# Patient Record
Sex: MALE | Race: WHITE | NOT HISPANIC OR LATINO
[De-identification: names, ages, dates, MRNs, and addresses within clinical notes are randomized per-mention and may not be internally consistent; named-entity substitution may affect disease eponyms.]

---

## 2017-08-31 ENCOUNTER — TRANSCRIPTION ENCOUNTER (OUTPATIENT)
Age: 42
End: 2017-08-31

## 2018-02-03 ENCOUNTER — TRANSCRIPTION ENCOUNTER (OUTPATIENT)
Age: 43
End: 2018-02-03

## 2019-02-28 PROBLEM — Z00.00 ENCOUNTER FOR PREVENTIVE HEALTH EXAMINATION: Status: ACTIVE | Noted: 2019-02-28

## 2019-03-06 ENCOUNTER — APPOINTMENT (OUTPATIENT)
Dept: ORTHOPEDIC SURGERY | Facility: CLINIC | Age: 44
End: 2019-03-06
Payer: COMMERCIAL

## 2019-03-06 VITALS — HEIGHT: 72 IN | WEIGHT: 187 LBS | BODY MASS INDEX: 25.33 KG/M2

## 2019-03-06 DIAGNOSIS — S83.242A OTHER TEAR OF MEDIAL MENISCUS, CURRENT INJURY, LEFT KNEE, INITIAL ENCOUNTER: ICD-10-CM

## 2019-03-06 DIAGNOSIS — Z78.9 OTHER SPECIFIED HEALTH STATUS: ICD-10-CM

## 2019-03-06 DIAGNOSIS — Z82.61 FAMILY HISTORY OF ARTHRITIS: ICD-10-CM

## 2019-03-06 PROCEDURE — 99204 OFFICE O/P NEW MOD 45 MIN: CPT

## 2019-03-06 PROCEDURE — 73562 X-RAY EXAM OF KNEE 3: CPT | Mod: LT

## 2019-03-07 PROBLEM — Z82.61 FAMILY HISTORY OF ARTHRITIS: Status: ACTIVE | Noted: 2019-03-06

## 2019-03-07 PROBLEM — Z78.9 CURRENT NON-DRINKER OF ALCOHOL: Status: ACTIVE | Noted: 2019-03-06

## 2019-03-07 PROBLEM — Z78.9 CURRENT NON-SMOKER: Status: ACTIVE | Noted: 2019-03-06

## 2019-03-15 ENCOUNTER — OUTPATIENT (OUTPATIENT)
Dept: OUTPATIENT SERVICES | Facility: HOSPITAL | Age: 44
LOS: 1 days | End: 2019-03-15
Payer: COMMERCIAL

## 2019-03-15 ENCOUNTER — APPOINTMENT (OUTPATIENT)
Dept: MRI IMAGING | Facility: CLINIC | Age: 44
End: 2019-03-15
Payer: COMMERCIAL

## 2019-03-15 DIAGNOSIS — Z00.8 ENCOUNTER FOR OTHER GENERAL EXAMINATION: ICD-10-CM

## 2019-03-15 PROCEDURE — 73721 MRI JNT OF LWR EXTRE W/O DYE: CPT | Mod: 26,LT

## 2019-03-15 PROCEDURE — 73721 MRI JNT OF LWR EXTRE W/O DYE: CPT

## 2019-03-28 ENCOUNTER — APPOINTMENT (OUTPATIENT)
Dept: ORTHOPEDIC SURGERY | Facility: CLINIC | Age: 44
End: 2019-03-28
Payer: COMMERCIAL

## 2019-03-28 DIAGNOSIS — M22.42 CHONDROMALACIA PATELLAE, LEFT KNEE: ICD-10-CM

## 2019-03-28 PROCEDURE — 99214 OFFICE O/P EST MOD 30 MIN: CPT

## 2019-03-29 PROBLEM — M22.42 CHONDROMALACIA OF LEFT PATELLA: Status: ACTIVE | Noted: 2019-03-29

## 2019-07-08 ENCOUNTER — TRANSCRIPTION ENCOUNTER (OUTPATIENT)
Age: 44
End: 2019-07-08

## 2021-03-19 PROBLEM — Z00.00 ENCOUNTER FOR PREVENTIVE HEALTH EXAMINATION: Noted: 2021-03-19

## 2021-03-22 ENCOUNTER — APPOINTMENT (OUTPATIENT)
Dept: SURGICAL ONCOLOGY | Facility: CLINIC | Age: 46
End: 2021-03-22

## 2021-03-22 DIAGNOSIS — D09.9 CARCINOMA IN SITU, UNSPECIFIED: ICD-10-CM

## 2021-03-22 DIAGNOSIS — Z78.9 OTHER SPECIFIED HEALTH STATUS: ICD-10-CM

## 2021-03-22 NOTE — REASON FOR VISIT
[Initial Consultation] : an initial consultation for [Skin Cancer] : skin caner [FreeTextEntry2] : squamous cell carcinoma left dorsal hand

## 2021-03-22 NOTE — HISTORY OF PRESENT ILLNESS
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 He was referred by Linsey Jack MD.\par \par He recently underwent a shave biopsy of the left radial dorsal hand on 3/11/21, dermatopathology revealed a diagnosis of squamous cell carcinoma in situ. \par \par He has a past medical history of??? . He is non-smoker, he consumes 1 alcoholic beverage daily.

## 2021-03-31 ENCOUNTER — APPOINTMENT (OUTPATIENT)
Dept: DERMATOLOGY | Facility: CLINIC | Age: 46
End: 2021-03-31
Payer: COMMERCIAL

## 2021-03-31 DIAGNOSIS — D04.60 CARCINOMA IN SITU OF SKIN OF UNSPECIFIED UPPER LIMB, INCLUDING SHOULDER: ICD-10-CM

## 2021-03-31 PROCEDURE — 99204 OFFICE O/P NEW MOD 45 MIN: CPT

## 2021-03-31 PROCEDURE — 99072 ADDL SUPL MATRL&STAF TM PHE: CPT

## 2021-03-31 RX ORDER — MUPIROCIN 20 MG/G
2 OINTMENT TOPICAL TWICE DAILY
Qty: 1 | Refills: 0 | Status: ACTIVE | COMMUNITY
Start: 2021-03-31 | End: 1900-01-01

## 2021-05-03 ENCOUNTER — APPOINTMENT (OUTPATIENT)
Dept: DERMATOLOGY | Facility: CLINIC | Age: 46
End: 2021-05-03

## 2023-08-22 ENCOUNTER — NON-APPOINTMENT (OUTPATIENT)
Age: 48
End: 2023-08-22

## 2025-02-24 ENCOUNTER — NON-APPOINTMENT (OUTPATIENT)
Age: 50
End: 2025-02-24

## 2025-07-03 ENCOUNTER — TELEMEDICINE (OUTPATIENT)
Dept: OTHER | Facility: HOSPITAL | Age: 50
End: 2025-07-03
Payer: COMMERCIAL

## 2025-07-03 VITALS — TEMPERATURE: 102.5 F

## 2025-07-03 DIAGNOSIS — S81.802A TRAUMATIC OPEN WOUND OF LEFT LOWER LEG WITH INFECTION, INITIAL ENCOUNTER: Primary | ICD-10-CM

## 2025-07-03 DIAGNOSIS — L08.9 TRAUMATIC OPEN WOUND OF LEFT LOWER LEG WITH INFECTION, INITIAL ENCOUNTER: Primary | ICD-10-CM

## 2025-07-03 PROBLEM — M25.50 ARTHRALGIA: Status: ACTIVE | Noted: 2024-05-24

## 2025-07-03 PROBLEM — K21.9 GASTROESOPHAGEAL REFLUX DISEASE: Status: ACTIVE | Noted: 2024-06-06

## 2025-07-03 PROCEDURE — 99214 OFFICE O/P EST MOD 30 MIN: CPT | Performed by: PHYSICIAN ASSISTANT

## 2025-07-03 RX ORDER — SULFAMETHOXAZOLE AND TRIMETHOPRIM 800; 160 MG/1; MG/1
1 TABLET ORAL 2 TIMES DAILY
Qty: 14 TABLET | Refills: 0 | Status: SHIPPED | OUTPATIENT
Start: 2025-07-03 | End: 2025-07-10

## 2025-07-03 RX ORDER — ALPRAZOLAM 0.5 MG
0.5 TABLET ORAL DAILY PRN
COMMUNITY

## 2025-07-03 RX ORDER — CELECOXIB 200 MG/1
200 CAPSULE ORAL DAILY PRN
COMMUNITY
Start: 2025-02-23

## 2025-07-04 NOTE — PATIENT INSTRUCTIONS
"\"Patient hoping to avoid ED/admission. Discussed he has been on abx <48 hours, so not definite outpt failure at this point. Given necrosis of wound center, concern for MRSA. Doubt brown recluse bite based on appearance. If no improvement in fever in 24-48 hours, streaking redness develops, or redness spreads beyond the 48 hours, recommend ED evaluation\"    Warm water soaks +/- Epsom salt 3 times daily. After 2 full days of being on the antibiotic, outline the area of redness with a permanent marker. If the redness spreads beyond the marked area, you develop fevers/chills, worsening pain, or streaking redness, you should go to the emergency department for evaluation. Please schedule a follow-up with your primary care provider as soon as possible.    [facility] Phone number is [phone] if you need assistance or have further questions  Notes for work/school can be found in the \"Letters\" section of [facility] radha    You can go to any outpatient [facility] to complete the blood cultures that were ordered  Just provide your name and date of birth at registration and they will be able to pull up the orders.  You can search for a lab using [facility] \"Find Care\" and filter by \"Labs\" or click the link below:  [email or web address]    The results will go directly to your [facility] under \"Test Results\"  [email or web address]  "

## 2025-07-04 NOTE — PROGRESS NOTES
Virtual Regular Visit  Name: Venkata Hawkins      : 1975      MRN: [id number]  Encounter Provider: Shannon D Severino, PA-C  Encounter Date: 7/3/2025   Encounter department: VIRTUAL CARE       Verification of patient location:  Patient is located at Other in the following state in which I hold an active license [place] :  Assessment & Plan  Traumatic open wound of left lower leg with infection, initial encounter    Orders:    sulfamethoxazole-trimethoprim (BACTRIM DS) 800-160 mg per tablet; Take 1 tablet by mouth in the morning and 1 tablet before bedtime. Do all this for 7 days.    Blood culture; Future    Blood culture; Future    Patient hoping to avoid ED/admission. Discussed he has been on abx <48 hours, so not definite outpt failure at this point. Given necrosis of wound center, concern for MRSA. Doubt brown recluse bite based on appearance. If no improvement in fever in 24-48 hours, streaking redness develops, or redness spreads beyond the 48 hours, recommend ED evaluation. Case complicated by holiday tomorrow.    Encounter provider Shannon D Severino, PA-C    The patient was identified by name and date of birth. Venkata Hawkins was informed that this is a telemedicine visit and that the visit is being conducted through the Epic Embedded platform. He agrees to proceed..  My office door was closed. No one else was in the room. He acknowledged consent and understanding of privacy and security of the video platform. The patient has agreed to participate and understands they can discontinue the visit at any time.    Patient is aware this is a billable service.     History obtained from: patient  History of Present Illness     Pt reports about 1 week ago he got a bug bite. He was scratching it, possibly with his boot. It was starting to look progressively more red, noticed necrosis of center of wound. Was seen by telehealth around noon today. Started on Augmentin, took second dose about 1 hour ago. Temp has gone  up to 102.5 this evening. Last tdap 2 years ago.        Review of Systems   Constitutional:  Negative for fever.   HENT:  Negative for nosebleeds.    Eyes:  Negative for redness.   Respiratory:  Negative for shortness of breath.    Cardiovascular:  Negative for chest pain.   Gastrointestinal:  Negative for blood in stool.   Genitourinary:  Negative for hematuria.   Musculoskeletal:  Negative for gait problem.   Skin:  Positive for color change and wound. Negative for rash.   Neurological:  Negative for seizures.   Psychiatric/Behavioral:  Negative for behavioral problems.        Objective   There were no vitals taken for this visit.    Physical Exam  Constitutional:       General: He is not in acute distress.     Appearance: Normal appearance. He is not toxic-appearing.   HENT:      Head: Normocephalic and atraumatic.      Nose: No rhinorrhea.      Mouth/Throat:      Mouth: Mucous membranes are moist.     Eyes:      Conjunctiva/sclera: Conjunctivae normal.     Pulmonary:      Effort: Pulmonary effort is normal. No respiratory distress.      Breath sounds: No wheezing (no gross audible wheeze through computer).     Musculoskeletal:      Cervical back: Normal range of motion.     Skin:     Findings: Erythema (L anterior mid tibia with superifical appearing wound necrosis about 0.8 cm with surounding dark erythema about 1.2 x 1.5 cm and slight erythema extending up to 1 cm from the edges) present. No rash (on face or neck).     Neurological:      Mental Status: He is alert.      Cranial Nerves: No dysarthria or facial asymmetry.     Psychiatric:         Mood and Affect: Mood normal.         Behavior: Behavior normal.         Visit Time  Total Visit Duration: 35 minutes not including the time spent for establishing the audio/video connection.

## 2025-07-05 ENCOUNTER — OFFICE VISIT (OUTPATIENT)
Dept: URGENT CARE | Facility: CLINIC | Age: 50
End: 2025-07-05
Payer: COMMERCIAL

## 2025-07-05 VITALS
SYSTOLIC BLOOD PRESSURE: 136 MMHG | HEART RATE: 65 BPM | RESPIRATION RATE: 19 BRPM | DIASTOLIC BLOOD PRESSURE: 88 MMHG | WEIGHT: 186 LBS | OXYGEN SATURATION: 99 % | TEMPERATURE: 98.8 F

## 2025-07-05 DIAGNOSIS — L08.9: Primary | ICD-10-CM
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
## 2025-07-05 DIAGNOSIS — S80.862D: Primary | ICD-10-CM

## 2025-07-05 PROCEDURE — 99204 OFFICE O/P NEW MOD 45 MIN: CPT | Performed by: PHYSICIAN ASSISTANT

## 2025-07-05 NOTE — PROGRESS NOTES
Patient Name: Venkata Hawkins      : 1975      MRN: [id number]  Encounter Provider: Latasha Monaco PA-C  Encounter Date: 2025  Encounter department: [facility] [place]    Assessment & Plan  Insect bite of left lower leg with infection, subsequent encounter       - Wound looks like it is healing well with just some mild dead tissue.  Fevers are gone, so patient can continue Bactrim.  Continue with wound care.  Told patient to get the blood cultures that telemedicine ordered for reassurance that this did not go into his bloodstream.  - Re-bandaged wound with bacitracin    Patient Instructions:   There are no Patient Instructions on file for this visit.    Subjective   Chief Complaint   Patient presents with    Insect Bite     Pt c/o incest bite left shin 1 week ago and now it's infected. Did two tel-a-doc visit and got Bactrim- little help.          Venkata Hawkins is a 49 y.o.male  Patient states that about a week ago he had an insect bite on his left shin he states that a few days ago it started looking infected.  He called telemedicine and they prescribed Augmentin which did not help.  He then called telemedicine again and they prescribed Bactrim and ordered blood cultures which he did not go get yet.  He has been taking the Bactrim for 1-1/2 days and states his fevers are gone now.  He is worried about the wound and wants someone to look at it.        Review of Systems   Constitutional:  Negative for chills, fatigue and fever.   HENT:  Negative for congestion, ear pain, postnasal drip, rhinorrhea, sinus pressure, sinus pain, sneezing and sore throat.    Eyes:  Negative for pain and visual disturbance.   Respiratory:  Negative for cough and shortness of breath.    Cardiovascular:  Negative for chest pain and palpitations.   Gastrointestinal:  Negative for abdominal pain, diarrhea, nausea and vomiting.   Genitourinary:  Negative for dysuria and hematuria.   Musculoskeletal:   Negative for arthralgias, back pain and myalgias.   Skin:  Positive for wound. Negative for rash.   Neurological:  Negative for dizziness, seizures, syncope, numbness and headaches.   All other systems reviewed and are negative.      Current Medications[1]  Allergies as of 07/05/2025 - Reviewed 07/05/2025   Allergen Reaction Noted    Minocycline Edema and Swelling 01/23/2017    Latex Rash 10/30/2020     Past Medical History[2]  Past Surgical History[3]  Family History[4]     Objective   /88   Pulse 65   Temp 98.8 °F (37.1 °C)   Resp 19   Wt 84.4 kg (186 lb)   SpO2 99%      Physical Exam  Vitals and nursing note reviewed.   Constitutional:       Appearance: Normal appearance. He is normal weight.   HENT:      Head: Normocephalic and atraumatic.     Cardiovascular:      Rate and Rhythm: Normal rate.   Pulmonary:      Effort: Pulmonary effort is normal.     Skin:     General: Skin is warm and dry.      Findings: Lesion (Large erythematous wheal on left anterior lower leg with central ulceration only through the epidermis with mild dead skin debris.  Surrounding erythema and some tenderness to palpation.) present.     Neurological:      General: No focal deficit present.      Mental Status: He is alert and oriented to person, place, and time.     Psychiatric:         Mood and Affect: Mood normal.         Behavior: Behavior normal.              [1]   Current Outpatient Medications:     ALPRAZolam (XANAX) 0.5 mg tablet, Take 0.5 mg by mouth daily as needed, Disp: , Rfl:     amoxicillin-clavulanate (AUGMENTIN) 875-125 mg per tablet, Take 1 tablet by mouth every 12 (twelve) hours, Disp: , Rfl:     celecoxib (CeleBREX) 200 mg capsule, Take 200 mg by mouth daily as needed, Disp: , Rfl:     sulfamethoxazole-trimethoprim (BACTRIM DS) 800-160 mg per tablet, Take 1 tablet by mouth in the morning and 1 tablet before bedtime. Do all this for 7 days., Disp: 14 tablet, Rfl: 0  [2] No past medical history on file.  [3] No  past surgical history on file.  [4] No family history on file.